# Patient Record
Sex: MALE | Race: WHITE | NOT HISPANIC OR LATINO | Employment: OTHER | URBAN - METROPOLITAN AREA
[De-identification: names, ages, dates, MRNs, and addresses within clinical notes are randomized per-mention and may not be internally consistent; named-entity substitution may affect disease eponyms.]

---

## 2018-12-29 ENCOUNTER — HOSPITAL ENCOUNTER (EMERGENCY)
Facility: HOSPITAL | Age: 28
Discharge: HOME | End: 2018-12-29
Attending: EMERGENCY MEDICINE
Payer: COMMERCIAL

## 2018-12-29 ENCOUNTER — APPOINTMENT (EMERGENCY)
Dept: CT IMAGING | Facility: HOSPITAL | Age: 28
End: 2018-12-29
Payer: COMMERCIAL

## 2018-12-29 VITALS
HEIGHT: 68 IN | RESPIRATION RATE: 18 BRPM | TEMPERATURE: 98.1 F | OXYGEN SATURATION: 99 % | BODY MASS INDEX: 29.37 KG/M2 | WEIGHT: 193.8 LBS | SYSTOLIC BLOOD PRESSURE: 146 MMHG | HEART RATE: 86 BPM | DIASTOLIC BLOOD PRESSURE: 69 MMHG

## 2018-12-29 DIAGNOSIS — G40.919 BREAKTHROUGH SEIZURE (HCC): Primary | ICD-10-CM

## 2018-12-29 DIAGNOSIS — S06.0X9A CONCUSSION: ICD-10-CM

## 2018-12-29 PROCEDURE — 99284 EMERGENCY DEPT VISIT MOD MDM: CPT

## 2018-12-29 PROCEDURE — 70450 CT HEAD/BRAIN W/O DYE: CPT

## 2018-12-29 NOTE — DISCHARGE INSTRUCTIONS
Follow up with his neurologist for further evaluation  Return any problems increasing pain vomiting or any worsening headache  Concussion   WHAT YOU NEED TO KNOW:   A concussion is a mild brain injury  It is usually caused by a bump or blow to the head from a fall, a motor vehicle crash, or a sports injury  Sometimes being shaken forcefully may cause a concussion  DISCHARGE INSTRUCTIONS:   Have someone else call 911 for the following:   · Someone tries to wake you and cannot do so  · You have a seizure, increasing confusion, or a change in personality  · Your speech becomes slurred, or you have new vision problems  Return to the emergency department if:   · You have a severe headache that does not go away  · You have arm or leg weakness, numbness, or new problems with coordination  · You have blood or clear fluid coming out of the ears or nose  Contact your healthcare provider if:   · You have nausea or are vomiting  · You feel more sleepy than usual     · Your symptoms get worse  · Your symptoms last longer than 6 weeks after the injury  · You have questions or concerns about your condition or care  Medicines:   · Acetaminophen  helps to decrease pain  It is available without a doctor's order  Ask how much to take and how often to take it  Follow directions  Acetaminophen can cause liver damage if not taken correctly  · NSAIDs , such as ibuprofen, help decrease swelling and pain  NSAIDs can cause stomach bleeding or kidney problems in certain people  If you take blood thinner medicine, always ask your healthcare provider if NSAIDs are safe for you  Always read the medicine label and follow directions  · Take your medicine as directed  Contact your healthcare provider if you think your medicine is not helping or if you have side effects  Tell him or her if you are allergic to any medicine  Keep a list of the medicines, vitamins, and herbs you take   Include the amounts, and when and why you take them  Bring the list or the pill bottles to follow-up visits  Carry your medicine list with you in case of an emergency  Follow up with your healthcare provider as directed:  Write down your questions so you remember to ask them during your visits  Self-care:   · Rest  from physical and mental activities as directed  Mental activities are those that require thinking, concentration, and attention  You will need to rest until your symptoms are gone  Rest will allow you to recover from your concussion  Ask your healthcare provider when you can return to work and other daily activities  · Have someone stay with you for the first 24 hours after your injury  Your healthcare provider should be contacted if your symptoms get worse, or you develop new symptoms  · Do not participate in sports and physical activities until your healthcare provider says it is okay  They could make your symptoms worse or lead to another concussion  Your healthcare provider will tell you when it is okay for you to return to sports or physical activities  Prevent another concussion:   · Wear protective sports equipment that fit properly  Helmets help decrease your risk of a serious brain injury  Talk to your healthcare provider about ways you can decrease your risk for a concussion if you play sports  · Wear your seat belt  every time you travel  This helps to decrease your risk of a head injury if you are in a car accident  © 2017 [employer]  Information is for End User's use only and may not be sold, redistributed or otherwise used for commercial purposes  All illustrations and images included in CareNotes® are the copyrighted property of [employer]  or [employer]  The above information is an  only  It is not intended as medical advice for individual conditions or treatments   Talk to your doctor, nurse or pharmacist before following any medical regimen to see if it is safe and effective for you  Epilepsy   WHAT YOU NEED TO KNOW:   Epilepsy is a brain disorder that causes seizures  It is also called a seizure disorder  A seizure means an abnormal area in your brain sometimes sends bursts of electrical activity  A seizure may start in one part of your brain, or both sides may be affected  Depending on the type of seizure, you may have movements you cannot control, lose consciousness, or stare straight ahead  You may be confused or tired after the seizure  A seizure may last a few seconds or longer than 5 minutes  A birth defect, tumor, stroke, dementia, injury, or infection may cause epilepsy  The cause of your epilepsy may not be known  If your seizures are not controlled, epilepsy may become life-threatening  DISCHARGE INSTRUCTIONS:   Call 911 for any of the following:   · Your seizure lasts longer than 5 minutes  · You have trouble breathing after a seizure  · You have diabetes or are pregnant and have a seizure  · You have a seizure in water, such as a swimming pool or bathtub  Return to the emergency department if:   · You have a second seizure within 24 hours of the first      · You are injured during a seizure  Contact your healthcare provider if:   · You feel you are not able to cope with your condition  · Your seizures start to happen more often  · You are confused longer than usual after a seizure  · You are planning to get pregnant or are currently pregnant  · You have questions or concerns about your condition or care  Medicines:   · Antiseizure medicine  may control or prevent another seizure  Do not stop taking this medicine  Another person may need to give you rescue medicine to stop a seizure at home  Ask your healthcare provider for more information about rescue medicine  · Take your medicine as directed  Contact your healthcare provider if you think your medicine is not helping or if you have side effects   Tell him of her if you are allergic to any medicine  Keep a list of the medicines, vitamins, and herbs you take  Include the amounts, and when and why you take them  Bring the list or the pill bottles to follow-up visits  Carry your medicine list with you in case of an emergency  Follow up with your neurologist as directed: You may need tests to check the level of antiseizure medicine in your blood  Your neurologist may need to change or adjust your medicine  Write down your questions so you remember to ask them during your visits  What you can do to prevent a seizure: You may not be able to prevent every seizure  The following can help you manage triggers that may make a seizure start:  · Take your medicine every day at the same time  This will also help prevent medicine side effects  Set an alarm to help remind you to take your medicine every day  · Manage stress  Stress can be a trigger for epilepsy  Exercise can help you reduce stress  Talk to your healthcare provider about exercise that is safe for you  Illness can be a form of stress  Eat a variety of healthy foods and drink plenty of liquids during an illness  Talk to your healthcare provider about other ways to manage stress  · Set a regular sleep schedule  A lack of sleep can trigger a seizure  Try to go to sleep and wake up at the same time every day  Keep your bedroom quiet and dark  Talk to your healthcare provider if you are having trouble sleeping  · Limit or do not drink alcohol as directed  Alcohol can trigger a seizure, especially if you drink a large amount at one time  A drink of alcohol is 12 ounces of beer, 1½ ounces of liquor, or 5 ounces of wine  Talk to your healthcare provider about a safe amount of alcohol for you  Your provider may recommend that you do not drink any alcohol  Tell him or her if you need help to quit drinking  What you can do to manage epilepsy:   · Keep a seizure diary    This can help you find your triggers and avoid them  Write down the dates of your seizures, where you were, and what you were doing  Include how you felt before and after  Possible triggers include illness, lack of sleep, hormonal changes, alcohol, drugs, lights, or stress  · Record any auras you have before a seizure  An aura is a sign that you are about to have a seizure  Auras happen before certain types of seizures that are in only 1 part of the brain  The aura may happen seconds before a seizure, or up to an hour before  You may feel, see, hear, or smell something  Examples include part of your body becoming hot  You may see a flash of light or hear something  You may have anxiety or déjà vu  If you have an aura, include it in your seizure diary  · Create a care plan  Tell family, friends, and coworkers about your epilepsy  Give them instructions that tell them how they can keep you safe if you have a seizure  · Find support  You may be referred to a psychologist or   Ask your healthcare provider about support groups for people with epilepsy  · Ask what safety precautions you should take  Talk with your healthcare provider about driving  You may not be able to drive until you are seizure-free for a period of time  You will need to check the law where you live  Also talk to your healthcare provider about swimming and bathing  You may drown or develop life-threatening heart or lung damage if you have a seizure in water  · Carry medical alert identification  Wear medical alert jewelry or carry a card that says you have epilepsy  Ask your healthcare provider where to get these items  How others can keep you safe during a seizure:  Give the following instructions to family, friends, and coworkers:  · Do not panic  · Do not hold me down or put anything in my mouth  · Gently guide me to the floor or a soft surface  · Place me on my side to help prevent me from swallowing saliva or vomit             · Protect me from injury  Remove sharp or hard objects from the area surrounding me, or cushion my head  · Loosen the clothing around my head and neck  · Time how long my seizure lasts  Call 911 if my seizure lasts longer than 5 minutes or if I have a second seizure  · Stay with me until my seizure ends  Let me rest until I am fully awake  · Perform CPR if I stop breathing or you cannot feel my pulse  · Do not give me anything to eat or drink until I am fully awake  © 2017 [employer]  Information is for End User's use only and may not be sold, redistributed or otherwise used for commercial purposes  All illustrations and images included in CareNotes® are the copyrighted property of [employer]  or [employer]  The above information is an  only  It is not intended as medical advice for individual conditions or treatments  Talk to your doctor, nurse or pharmacist before following any medical regimen to see if it is safe and effective for you

## 2018-12-29 NOTE — ED PROVIDER NOTES
History  Chief Complaint   Patient presents with    Fall     pt presents via EMS for fall, pt fell and hit back of head, from [facility] on arrival      HPI  Patient is a 27-year-old male, history of seizures in the past, apparently lives in a group home and has an assistant for compliance with his medications  Patient apparently fell backwards hitting his head during possible seizure  Patient had some focal shaking  They report he was unresponsive for few seconds and slowly came around  Patient reports some pain in the back of his head  He denies any neck pain  Denies any other pain or weakness  There is no chest or abdominal injury  Patient denies any vomiting  Denies any focal weakness  The patient's aide reports no change in his mental status or personality  Past medical history seizure disorder in a personal care home for medication compliance  Family history noncontributory  Social history, no ill contacts, denies drug abuse  None       History reviewed  No pertinent past medical history  History reviewed  No pertinent surgical history  History reviewed  No pertinent family history  I have reviewed and agree with the history as documented  Social History   Substance Use Topics    Smoking status: Not on file    Smokeless tobacco: Not on file    Alcohol use Not on file        Review of Systems   Constitutional: Negative for diaphoresis, fatigue and fever  HENT: Negative for congestion, ear pain, nosebleeds and sore throat  Eyes: Negative for photophobia, pain, discharge and visual disturbance  Respiratory: Negative for cough, choking, chest tightness, shortness of breath and wheezing  Cardiovascular: Negative for chest pain and palpitations  Gastrointestinal: Negative for abdominal distention, abdominal pain, diarrhea and vomiting  Genitourinary: Negative for dysuria, flank pain and frequency  Musculoskeletal: Negative for back pain, gait problem and joint swelling  Skin: Negative for color change and rash  Neurological: Positive for headaches  Negative for dizziness and syncope  Psychiatric/Behavioral: Negative for behavioral problems and confusion  The patient is not nervous/anxious  All other systems reviewed and are negative  Physical Exam  Physical Exam   Constitutional: He is oriented to person, place, and time  He appears well-developed and well-nourished  HENT:   Head: Normocephalic  Right Ear: External ear normal    Left Ear: External ear normal    Nose: Nose normal    Mouth/Throat: Oropharynx is clear and moist    Eyes: Pupils are equal, round, and reactive to light  EOM and lids are normal    Neck: Normal range of motion  Neck supple  Cardiovascular: Normal rate, regular rhythm, normal heart sounds and intact distal pulses  Pulmonary/Chest: Effort normal and breath sounds normal  No respiratory distress  Abdominal: Soft  Musculoskeletal: Normal range of motion  He exhibits no deformity  Neurological: He is alert and oriented to person, place, and time  No cranial nerve deficit or sensory deficit  Coordination normal  GCS eye subscore is 4  GCS verbal subscore is 5  GCS motor subscore is 6  Skin: Skin is warm and dry  Psychiatric: He has a normal mood and affect  Nursing note and vitals reviewed     Pulse oximetry 99% on room air adequate oxygenation, no hypoxia    Vital Signs  ED Triage Vitals [12/29/18 1252]   Temperature Pulse Respirations Blood Pressure SpO2   98 1 °F (36 7 °C) 86 18 146/69 99 %      Temp Source Heart Rate Source Patient Position - Orthostatic VS BP Location FiO2 (%)   Oral Monitor Sitting Right arm --      Pain Score       --           Vitals:    12/29/18 1252   BP: 146/69   Pulse: 86   Patient Position - Orthostatic VS: Sitting       Visual Acuity      ED Medications  Medications - No data to display    Diagnostic Studies  Results Reviewed     None                 CT head without contrast   Final Result by Estell Frankel, MD (12/29 1400)      No acute intracranial abnormality  Workstation performed: [id number]                    Procedures  Procedures       Phone Contacts  ED Phone Contact    ED Course        patient is a difficult historian as he has some cognitive impairment  He apparently is in a group home and takes medications for seizure disorder according to his assistant  The assistant is helpful in saying that he has some baseline cognitive issues but does  Not know if he had a brain injury or what his underlying issues are  She reports he is at baseline  He is awake and alert since the injury  We treatment options patient believes he has a concussion will CT  CT scan was normal               [facility] medical decision making 52-year-old male presents after what appears to be a breakthrough seizure, now awake and alert at normal neurological function  CT showed no acute pathology  Discussed outpatient treatment and follow-up  CritCare Time    Disposition  Final diagnoses:   Breakthrough seizure ([address] )   Concussion     Time reflects when diagnosis was documented in both MDM as applicable and the Disposition within this note     Time User Action Codes Description Comment    12/29/2018  2:05 PM Meera Naidu Add [G40 919] Breakthrough seizure ([address] )     12/29/2018  2:05 PM Meera Naidu Add [S06 0X9A] Concussion       ED Disposition     ED Disposition Condition Comment    Discharge  [facility] [address] discharge to home/self care  Condition at discharge: Good        Follow-up Information    None         There are no discharge medications for this patient  No discharge procedures on file      ED Provider  Electronically Signed by           Karl Rincon MD  12/30/18 5865

## 2020-03-01 ENCOUNTER — HOSPITAL ENCOUNTER (EMERGENCY)
Facility: HOSPITAL | Age: 30
Discharge: HOME | End: 2020-03-01
Attending: EMERGENCY MEDICINE | Admitting: EMERGENCY MEDICINE
Payer: COMMERCIAL

## 2020-03-01 ENCOUNTER — APPOINTMENT (EMERGENCY)
Dept: CT IMAGING | Facility: HOSPITAL | Age: 30
End: 2020-03-01
Payer: COMMERCIAL

## 2020-03-01 VITALS
DIASTOLIC BLOOD PRESSURE: 81 MMHG | RESPIRATION RATE: 16 BRPM | OXYGEN SATURATION: 97 % | BODY MASS INDEX: 29.23 KG/M2 | WEIGHT: 192.24 LBS | SYSTOLIC BLOOD PRESSURE: 134 MMHG | HEART RATE: 75 BPM | TEMPERATURE: 98.9 F

## 2020-03-01 DIAGNOSIS — S01.91XA LACERATION OF HEAD: Primary | ICD-10-CM

## 2020-03-01 PROCEDURE — 12013 RPR F/E/E/N/L/M 2.6-5.0 CM: CPT | Performed by: EMERGENCY MEDICINE

## 2020-03-01 PROCEDURE — 99282 EMERGENCY DEPT VISIT SF MDM: CPT | Performed by: EMERGENCY MEDICINE

## 2020-03-01 PROCEDURE — 70450 CT HEAD/BRAIN W/O DYE: CPT

## 2020-03-01 PROCEDURE — 99284 EMERGENCY DEPT VISIT MOD MDM: CPT

## 2020-03-01 RX ORDER — LIDOCAINE HYDROCHLORIDE AND EPINEPHRINE 10; 10 MG/ML; UG/ML
10 INJECTION, SOLUTION INFILTRATION; PERINEURAL ONCE
Status: COMPLETED | OUTPATIENT
Start: 2020-03-01 | End: 2020-03-01

## 2020-03-01 RX ORDER — GINSENG 100 MG
1 CAPSULE ORAL ONCE
Status: COMPLETED | OUTPATIENT
Start: 2020-03-01 | End: 2020-03-01

## 2020-03-01 RX ADMIN — BACITRACIN ZINC 1 SMALL APPLICATION: 500 OINTMENT TOPICAL at 18:59

## 2020-03-01 RX ADMIN — LIDOCAINE HYDROCHLORIDE,EPINEPHRINE BITARTRATE 10 ML: 10; .01 INJECTION, SOLUTION INFILTRATION; PERINEURAL at 18:07

## 2020-03-01 NOTE — ED PROVIDER NOTES
History  Chief Complaint   Patient presents with    Head Injury     Pt presents to the ED via EMS after tripping and falling outside hitting his head  Pt has a 1inch laceration to the left side of head  Pt denies LOC and BT     30year old male patient presents emergency department for evaluation of a mechanical fall resulting in a 5 cm laceration to the parietal aspect, left side of his head  The patient's fall was witnessed, he had no seizure activity, no consciousness, he is at his normal baseline stated mental status, and has been since the time of the injury  Plan will be for CT scan of the head because he has been complaining of dizziness for last two days and then the laceration will be closed utilizing skin staples  Five skin staples were placed in the 5 cm linear laceration  Wound was well approximated, bleeding was controlled, the patient was discharged home  History provided by:  Patient   used: No    Fall   Mechanism of injury: fall    Injury location:  Head/neck  Arrived directly from scene: no    Fall:     Entrapped after fall: no    Protective equipment: helmet    Associated symptoms: no blindness, no chest pain, no headaches, no hearing loss and no seizures        None       Past Medical History:   Diagnosis Date    Asthma     Encephalopathy     Epile[address] [address] )     Intellectual disability        History reviewed  No pertinent surgical history  History reviewed  No pertinent family history  I have reviewed and agree with the history as documented  E-Cigarette/Vaping    E-Cigarette Use Never User      E-Cigarette/Vaping Substances     Social History     Tobacco Use    Smoking status: Never Smoker    Smokeless tobacco: Never Used   Substance Use Topics    Alcohol use: Never     Frequency: Never    Drug use: Never       Review of Systems   HENT: Negative for hearing loss  Eyes: Negative for blindness  Cardiovascular: Negative for chest pain  Neurological: Negative for seizures and headaches  All other systems reviewed and are negative  Physical Exam  Physical Exam   Constitutional: He is oriented to person, place, and time  He appears well-developed and well-nourished  No distress  HENT:   Head: Normocephalic and atraumatic  Right Ear: External ear normal    Left Ear: External ear normal    Eyes: Conjunctivae and EOM are normal  Right eye exhibits no discharge  Left eye exhibits no discharge  No scleral icterus  Neck: Normal range of motion  Neck supple  No JVD present  No tracheal deviation present  No thyromegaly present  Cardiovascular: Normal rate and regular rhythm  Pulmonary/Chest: Effort normal and breath sounds normal  No stridor  No respiratory distress  He has no wheezes  He has no rales  Abdominal: Soft  Bowel sounds are normal  He exhibits no distension  There is no tenderness  Musculoskeletal: Normal range of motion  He exhibits no edema, tenderness or deformity  Neurological: He is alert and oriented to person, place, and time  No cranial nerve deficit  Coordination normal    Skin: Skin is warm and dry  He is not diaphoretic  Psychiatric: He has a normal mood and affect  His behavior is normal    Nursing note and vitals reviewed        Vital Signs  ED Triage Vitals [03/01/20 1740]   Temperature Pulse Respirations Blood Pressure SpO2   98 9 °F (37 2 °C) 75 16 134/81 97 %      Temp Source Heart Rate Source Patient Position - Orthostatic VS BP Location FiO2 (%)   Oral -- -- Right arm --      Pain Score       Worst Possible Pain           Vitals:    03/01/20 1740   BP: 134/81   Pulse: 75         Visual Acuity  Visual Acuity      Most Recent Value   L Pupil Size (mm)  4   R Pupil Size (mm)  4          ED Medications  Medications   lidocaine-epinephrine (XYLOCAINE/EPINEPHRINE) 1 %-1:100,000 injection 10 mL (10 mL Infiltration Given 3/1/20 1807)   bacitracin topical ointment 1 small application (1 small application Topical Given 3/1/20 1859)       Diagnostic Studies  Results Reviewed     None                 CT head without contrast   Final Result by Jefry Dumont MD (03/01 1843)      No acute intracranial abnormality  Left scalp laceration  Workstation performed: [id number]                    Procedures  Procedures         ED Course                               MDM  Number of Diagnoses or Management Options  Laceration of head: new and requires workup     Amount and/or Complexity of Data Reviewed  Tests in the radiology section of CPT®: ordered and reviewed  Decide to obtain previous medical records or to obtain history from someone other than the patient: yes  Review and summarize past medical records: yes    Patient Progress  Patient progress: stable        Disposition  Final diagnoses:   Laceration of head     Time reflects when diagnosis was documented in both MDM as applicable and the Disposition within this note     Time User Action Codes Description Comment    3/1/2020  7:08 PM Elfredia Dragoon Add [S01 91XA] Laceration of head       ED Disposition     ED Disposition Condition Date/Time Comment    Discharge Stable Sun Mar 1, 2020  7:08 PM Jasbir Reyes discharge to home/self care  Follow-up Information    None         There are no discharge medications for this patient  No discharge procedures on file      PDMP Review     None          ED Provider  Electronically Signed by           Jes Salvador DO  03/05/20 1426